# Patient Record
Sex: MALE | Race: OTHER | Employment: OTHER | URBAN - METROPOLITAN AREA
[De-identification: names, ages, dates, MRNs, and addresses within clinical notes are randomized per-mention and may not be internally consistent; named-entity substitution may affect disease eponyms.]

---

## 2018-02-01 ENCOUNTER — HOSPITAL ENCOUNTER (EMERGENCY)
Age: 28
Discharge: HOME | End: 2018-02-01
Attending: EMERGENCY MEDICINE
Payer: SELF-PAY

## 2018-02-01 VITALS
RESPIRATION RATE: 17 BRPM | TEMPERATURE: 100.4 F | WEIGHT: 178 LBS | HEIGHT: 70 IN | DIASTOLIC BLOOD PRESSURE: 79 MMHG | OXYGEN SATURATION: 100 % | BODY MASS INDEX: 25.48 KG/M2 | HEART RATE: 118 BPM | SYSTOLIC BLOOD PRESSURE: 131 MMHG

## 2018-02-01 DIAGNOSIS — J10.1 INFLUENZA A: Primary | ICD-10-CM

## 2018-02-01 PROCEDURE — 99282 EMERGENCY DEPT VISIT SF MDM: CPT

## 2018-02-01 RX ORDER — IBUPROFEN 800 MG/1
800 TABLET ORAL EVERY 8 HOURS
Qty: 15 TAB | Refills: 0 | Status: SHIPPED | OUTPATIENT
Start: 2018-02-01 | End: 2018-02-06

## 2018-02-01 RX ORDER — ACETAMINOPHEN 500 MG
TABLET ORAL
COMMUNITY

## 2018-02-01 RX ORDER — ONDANSETRON 4 MG/1
TABLET, ORALLY DISINTEGRATING ORAL
Qty: 10 TAB | Refills: 0 | Status: SHIPPED | OUTPATIENT
Start: 2018-02-01

## 2018-02-01 NOTE — ED PROVIDER NOTES
EMERGENCY DEPARTMENT HISTORY AND PHYSICAL EXAM    5:01 PM      Date: 2/1/2018  Patient Name: Taylor Vazquez    History of Presenting Illness     Chief Complaint   Patient presents with    Fever    Headache    Generalized Body Aches    Vomiting         History Provided By: Patient     Chief Complaint: Generalized Body Aches   Duration:  3 days   Timing:  Constant   Location: Generalized   Quality: N/A  Severity: 10/10  Modifying Factors:  Patient has been drinking water, taking Dayquil and Tylenol with mild relief in his symptoms. Associated Symptoms: Associated symptoms include fever, HA, a dry cough, chest tightness that is exacerbated when coughing, and vomiting. Additional History (Context): Taylor Vazquez is a 24 y.o. male presents with generalized body aches for 3 days. Associated symptoms include fever, HA, a dry cough, chest tightness that is exacerbated when coughing, and vomiting. Patient has been drinking water, taking Dayquil and Tylenol with mild relief in his symptoms. Patient expresses concern of giving his symptoms to his son. Patient expresses no other concerns or symptoms at this time. PCP: No primary care provider on file. Current Outpatient Prescriptions   Medication Sig Dispense Refill    phenylephrine-dm-acetaminophen (VICKS DAYQUIL) 5- mg cap Take  by mouth.  acetaminophen (TYLENOL) 500 mg tablet Take  by mouth every six (6) hours as needed for Pain.  ondansetron (ZOFRAN ODT) 4 mg disintegrating tablet Take 1-2 tablets every 6-8 hours as needed for nausea and vomiting. 10 Tab 0    ibuprofen (MOTRIN) 800 mg tablet Take 1 Tab by mouth every eight (8) hours for 5 days. 15 Tab 0       Past History     Past Medical History:  History reviewed. No pertinent past medical history. Past Surgical History:  History reviewed. No pertinent surgical history. Family History:  History reviewed. No pertinent family history.     Social History:  Social History   Substance Use Topics    Smoking status: Never Smoker    Smokeless tobacco: Never Used    Alcohol use None       Allergies:  No Known Allergies      Review of Systems     Review of Systems   Constitutional: Positive for fever. Negative for diaphoresis. HENT: Negative for congestion and sore throat. Eyes: Negative for pain and itching. Respiratory: Positive for cough and chest tightness. Negative for shortness of breath. Cardiovascular: Negative for chest pain and palpitations. Gastrointestinal: Positive for vomiting. Negative for abdominal pain and diarrhea. Endocrine: Negative for polydipsia and polyuria. Genitourinary: Negative for dysuria and hematuria. Musculoskeletal: Positive for myalgias (Generalized). Negative for arthralgias. Skin: Negative for rash and wound. Neurological: Positive for headaches. Negative for seizures and syncope. Hematological: Does not bruise/bleed easily. Psychiatric/Behavioral: Negative for agitation and hallucinations. Physical Exam     Visit Vitals    /79 (BP 1 Location: Right arm, BP Patient Position: At rest)    Pulse (!) 118    Temp 100.4 °F (38 °C)    Resp 17    Ht 5' 10\" (1.778 m)    Wt 80.7 kg (178 lb)    SpO2 100%    BMI 25.54 kg/m2       Physical Exam   Constitutional: He appears well-developed and well-nourished. HENT:   Head: Normocephalic and atraumatic. Eyes: Conjunctivae are normal. No scleral icterus. Neck: Normal range of motion. Neck supple. No JVD present. Cardiovascular: Regular rhythm and intact distal pulses. Tachycardia present. Pulmonary/Chest: Effort normal. No respiratory distress. Mild cough   Musculoskeletal: Normal range of motion. Neurological: He is alert. Skin: Skin is warm and dry. Psychiatric: Judgment and thought content normal.   Nursing note and vitals reviewed.         Diagnostic Study Results     Labs -  No results found for this or any previous visit (from the past 12 hour(s)). Radiologic Studies -   No orders to display     No results found. Medical Decision Making   Initial Medical Decision Making and DDx:  Clinically consistent with the flu. Patient is not within the window range to receive Tamiflu. I discussed symptomatic management: rest, fluids, and OTC medication. Patient's questions were answered and is happy with the plan. ED Course: Progress Notes, Reevaluation, and Consults:  None    I am the first provider for this patient. I reviewed the vital signs, available nursing notes, past medical history, past surgical history, family history and social history. Vital Signs-Reviewed the patient's vital signs. Pulse Oximetry Analysis - 100% Room Air     Records Reviewed: Nursing Notes and Triage notes (Time of Review: 5:01 PM)    Procedures: None     Critical Care Time: None     Diagnosis     Clinical Impression:   1. Influenza A        Disposition: Discharged     Follow-up Information     Follow up With Details Comments [address] In 2 days  [address]  [phone]           Patient's Medications   Start Taking    IBUPROFEN (MOTRIN) 800 MG TABLET    Take 1 Tab by mouth every eight (8) hours for 5 days. ONDANSETRON (ZOFRAN ODT) 4 MG DISINTEGRATING TABLET    Take 1-2 tablets every 6-8 hours as needed for nausea and vomiting. Continue Taking    ACETAMINOPHEN (TYLENOL) 500 MG TABLET    Take  by mouth every six (6) hours as needed for Pain. PHENYLEPHRINE-DM-ACETAMINOPHEN (VICKS DAYQUIL) 5- MG CAP    Take  by mouth.    These Medications have changed    No medications on file   Stop Taking    No medications on file     _______________________________    Attestations:  [address] [place] [address] acting as a scribe for and in the presence of Kary Freitas MD      February 01, 2018 at 5:01 PM       Provider Attestation:      I personally performed the services described in the documentation, reviewed the documentation, as recorded by the scribe in my presence, and it accurately and completely records my words and actions.  February 01, 2018 at 5:01 PM - Faisal Dominguez MD    _______________________________

## 2018-02-01 NOTE — DISCHARGE INSTRUCTIONS
